# Patient Record
Sex: MALE
[De-identification: names, ages, dates, MRNs, and addresses within clinical notes are randomized per-mention and may not be internally consistent; named-entity substitution may affect disease eponyms.]

---

## 2020-11-16 ENCOUNTER — RESULT REVIEW (OUTPATIENT)
Age: 65
End: 2020-11-16

## 2021-04-12 ENCOUNTER — APPOINTMENT (OUTPATIENT)
Dept: DISASTER EMERGENCY | Facility: OTHER | Age: 66
End: 2021-04-12
Payer: COMMERCIAL

## 2021-04-12 PROCEDURE — 0012A: CPT

## 2023-08-01 ENCOUNTER — OFFICE (OUTPATIENT)
Dept: URBAN - METROPOLITAN AREA CLINIC 109 | Facility: CLINIC | Age: 68
Setting detail: OPHTHALMOLOGY
End: 2023-08-01
Payer: MEDICARE

## 2023-08-01 DIAGNOSIS — H25.13: ICD-10-CM

## 2023-08-01 DIAGNOSIS — H43.393: ICD-10-CM

## 2023-08-01 DIAGNOSIS — D31.31: ICD-10-CM

## 2023-08-01 PROCEDURE — 92014 COMPRE OPH EXAM EST PT 1/>: CPT | Performed by: OPHTHALMOLOGY

## 2023-08-01 ASSESSMENT — CONFRONTATIONAL VISUAL FIELD TEST (CVF)
OS_FINDINGS: FULL
OD_FINDINGS: FULL

## 2023-08-01 ASSESSMENT — TONOMETRY
OD_IOP_MMHG: 15
OS_IOP_MMHG: 16

## 2023-08-02 ASSESSMENT — REFRACTION_CURRENTRX
OS_SPHERE: -5.00
OS_OVR_VA: 20/
OD_CYLINDER: -2.00
OD_SPHERE: -5.25
OD_AXIS: 44
OD_OVR_VA: 20/
OS_AXIS: 70
OS_CYLINDER: -1.50

## 2023-08-02 ASSESSMENT — VISUAL ACUITY
OS_BCVA: 20/300
OD_BCVA: 20/20

## 2023-08-02 ASSESSMENT — REFRACTION_AUTOREFRACTION
OD_CYLINDER: -3.00
OS_AXIS: 67
OD_AXIS: 36
OS_CYLINDER: -1.50
OD_SPHERE: -3.75
OS_SPHERE: -4.00

## 2023-08-02 ASSESSMENT — REFRACTION_MANIFEST
OS_SPHERE: -4.50
OD_AXIS: 40
OS_AXIS: 75
OS_CYLINDER: -1.00
OD_CYLINDER: -2.50
OD_SPHERE: -5.00
OS_VA1: 20/20

## 2023-08-02 ASSESSMENT — SPHEQUIV_DERIVED
OS_SPHEQUIV: -5
OS_SPHEQUIV: -4.75
OD_SPHEQUIV: -5.25
OD_SPHEQUIV: -6.25

## 2023-09-29 ENCOUNTER — APPOINTMENT (OUTPATIENT)
Dept: CARDIOLOGY | Facility: CLINIC | Age: 68
End: 2023-09-29
Payer: MEDICARE

## 2023-09-29 ENCOUNTER — NON-APPOINTMENT (OUTPATIENT)
Age: 68
End: 2023-09-29

## 2023-09-29 VITALS
HEIGHT: 71 IN | SYSTOLIC BLOOD PRESSURE: 163 MMHG | BODY MASS INDEX: 27.72 KG/M2 | WEIGHT: 198 LBS | DIASTOLIC BLOOD PRESSURE: 87 MMHG | OXYGEN SATURATION: 96 % | HEART RATE: 52 BPM

## 2023-09-29 DIAGNOSIS — R00.1 BRADYCARDIA, UNSPECIFIED: ICD-10-CM

## 2023-09-29 DIAGNOSIS — R03.0 ELEVATED BLOOD-PRESSURE READING, W/OUT DIAGNOSIS OF HYPERTENSION: ICD-10-CM

## 2023-09-29 PROCEDURE — 99204 OFFICE O/P NEW MOD 45 MIN: CPT

## 2023-09-29 PROCEDURE — 93000 ELECTROCARDIOGRAM COMPLETE: CPT

## 2023-10-09 ENCOUNTER — APPOINTMENT (OUTPATIENT)
Dept: CARDIOLOGY | Facility: CLINIC | Age: 68
End: 2023-10-09

## 2023-10-10 ENCOUNTER — APPOINTMENT (OUTPATIENT)
Dept: CARDIOLOGY | Facility: CLINIC | Age: 68
End: 2023-10-10
Payer: MEDICARE

## 2023-10-10 PROCEDURE — 93306 TTE W/DOPPLER COMPLETE: CPT

## 2023-10-10 PROCEDURE — 93015 CV STRESS TEST SUPVJ I&R: CPT

## 2024-08-06 ENCOUNTER — OFFICE (OUTPATIENT)
Dept: URBAN - METROPOLITAN AREA CLINIC 109 | Facility: CLINIC | Age: 69
Setting detail: OPHTHALMOLOGY
End: 2024-08-06
Payer: MEDICARE

## 2024-08-06 DIAGNOSIS — D31.31: ICD-10-CM

## 2024-08-06 DIAGNOSIS — H25.13: ICD-10-CM

## 2024-08-06 DIAGNOSIS — H43.393: ICD-10-CM

## 2024-08-06 PROCEDURE — 92014 COMPRE OPH EXAM EST PT 1/>: CPT | Performed by: OPHTHALMOLOGY

## 2024-08-06 ASSESSMENT — CONFRONTATIONAL VISUAL FIELD TEST (CVF)
OD_FINDINGS: FULL
OS_FINDINGS: FULL

## 2024-10-01 ENCOUNTER — NON-APPOINTMENT (OUTPATIENT)
Age: 69
End: 2024-10-01

## 2024-10-01 ENCOUNTER — APPOINTMENT (OUTPATIENT)
Dept: CARDIOLOGY | Facility: CLINIC | Age: 69
End: 2024-10-01
Payer: MEDICARE

## 2024-10-01 VITALS
WEIGHT: 198 LBS | DIASTOLIC BLOOD PRESSURE: 82 MMHG | HEIGHT: 71 IN | SYSTOLIC BLOOD PRESSURE: 152 MMHG | OXYGEN SATURATION: 98 % | BODY MASS INDEX: 27.72 KG/M2 | HEART RATE: 53 BPM

## 2024-10-01 DIAGNOSIS — R00.1 BRADYCARDIA, UNSPECIFIED: ICD-10-CM

## 2024-10-01 PROCEDURE — 93000 ELECTROCARDIOGRAM COMPLETE: CPT

## 2024-10-01 PROCEDURE — 99214 OFFICE O/P EST MOD 30 MIN: CPT

## 2024-10-01 PROCEDURE — G2211 COMPLEX E/M VISIT ADD ON: CPT

## 2024-10-01 NOTE — DISCUSSION/SUMMARY
[FreeTextEntry1] : This is a 69 year old man with a history of BPH who presents to the office for a cardiac evaluation.  His BP is high in the MD office, but he does monitor it at home, and he gets normal readings.   He has no new symptoms.  His exam and EKG are unchanged.  His non invasive evaluation one year ago was unrevealing.  We discussed the benefits of a healthy diet, regular exercise and physical activity, and weight loss in detail.  He will continue to follow annually.  [EKG obtained to assist in diagnosis and management of assessed problem(s)] : EKG obtained to assist in diagnosis and management of assessed problem(s)

## 2024-10-01 NOTE — HISTORY OF PRESENT ILLNESS
[FreeTextEntry1] : This is a 69 year old man with a history of BPH who presents to the office for a cardiac evaluation.  His BP is high in the MD office, but he does monitor it at home, and he gets normal readings.  He wanted to make sure nothing was wrong with his heart.  He is under a lot of stress at work.  He exercises on the [place] every other day.   He never has any issues with exertion.   He denies chest pains, increased dyspnea, PND, orthopnea, LE swelling, dizziness, or syncope . Recent blood work was reportedly normal.  Stress testing and echocardiography one year ago were unrevealing.

## 2025-06-27 ENCOUNTER — APPOINTMENT (OUTPATIENT)
Dept: ORTHOPEDIC SURGERY | Facility: CLINIC | Age: 70
End: 2025-06-27
Payer: MEDICARE

## 2025-06-27 VITALS — HEIGHT: 71 IN | WEIGHT: 196 LBS | BODY MASS INDEX: 27.44 KG/M2

## 2025-06-27 PROCEDURE — 20600 DRAIN/INJ JOINT/BURSA W/O US: CPT | Mod: FA

## 2025-06-27 PROCEDURE — 99203 OFFICE O/P NEW LOW 30 MIN: CPT | Mod: 25

## 2025-06-27 PROCEDURE — J3490M: CUSTOM | Mod: NC,JZ

## 2025-08-12 ENCOUNTER — OFFICE (OUTPATIENT)
Dept: URBAN - METROPOLITAN AREA CLINIC 109 | Facility: CLINIC | Age: 70
Setting detail: OPHTHALMOLOGY
End: 2025-08-12
Payer: MEDICARE

## 2025-08-12 DIAGNOSIS — H43.393: ICD-10-CM

## 2025-08-12 DIAGNOSIS — H25.13: ICD-10-CM

## 2025-08-12 DIAGNOSIS — D31.31: ICD-10-CM

## 2025-08-12 PROCEDURE — 92250 FUNDUS PHOTOGRAPHY W/I&R: CPT | Performed by: OPHTHALMOLOGY

## 2025-08-12 PROCEDURE — 92014 COMPRE OPH EXAM EST PT 1/>: CPT | Performed by: OPHTHALMOLOGY

## 2025-08-12 ASSESSMENT — KERATOMETRY
OD_K2POWER_DIOPTERS: 44.50
OD_AXISANGLE_DEGREES: 153
OD_K1POWER_DIOPTERS: 43.50
OS_AXISANGLE_DEGREES: 065
OS_K2POWER_DIOPTERS: 44.25
OS_K1POWER_DIOPTERS: 43.75

## 2025-08-12 ASSESSMENT — REFRACTION_CURRENTRX
OD_OVR_VA: 20/
OS_AXIS: 70
OD_CYLINDER: -2.00
OS_SPHERE: -5.00
OS_OVR_VA: 20/
OD_AXIS: 44
OS_CYLINDER: -1.50
OD_SPHERE: -5.25

## 2025-08-12 ASSESSMENT — REFRACTION_MANIFEST
OD_SPHERE: -5.00
OS_SPHERE: -4.50
OS_CYLINDER: -1.00
OD_CYLINDER: -2.50
OS_VA1: 20/20
OS_AXIS: 75
OD_AXIS: 40

## 2025-08-12 ASSESSMENT — REFRACTION_AUTOREFRACTION
OS_CYLINDER: -0.75
OS_AXIS: 075
OD_SPHERE: -4.50
OD_AXIS: 045
OS_SPHERE: -5.00
OD_CYLINDER: -2.50

## 2025-08-12 ASSESSMENT — VISUAL ACUITY
OS_BCVA: 20/300
OD_BCVA: 20/20-2

## 2025-08-12 ASSESSMENT — CONFRONTATIONAL VISUAL FIELD TEST (CVF)
OS_FINDINGS: FULL
OD_FINDINGS: FULL

## 2025-08-12 ASSESSMENT — TONOMETRY
OS_IOP_MMHG: 14
OD_IOP_MMHG: 14
OD_IOP_MMHG: 17
OS_IOP_MMHG: 18